# Patient Record
Sex: MALE | Race: BLACK OR AFRICAN AMERICAN | NOT HISPANIC OR LATINO | Employment: FULL TIME | URBAN - METROPOLITAN AREA
[De-identification: names, ages, dates, MRNs, and addresses within clinical notes are randomized per-mention and may not be internally consistent; named-entity substitution may affect disease eponyms.]

---
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
## 2022-04-07 ENCOUNTER — HISTORICAL (OUTPATIENT)
Dept: ADMINISTRATIVE | Facility: HOSPITAL | Age: 22
End: 2022-04-07

## 2022-04-23 VITALS
HEIGHT: 69 IN | DIASTOLIC BLOOD PRESSURE: 77 MMHG | WEIGHT: 140.44 LBS | OXYGEN SATURATION: 100 % | BODY MASS INDEX: 20.8 KG/M2 | SYSTOLIC BLOOD PRESSURE: 121 MMHG

## 2022-04-29 NOTE — ED PROVIDER NOTES
Patient:   Prabhakar Crowe Jr            MRN: [id number]            FIN: [id number]               Age:   20 years     Sex:  Male     :  2000   Associated Diagnoses:   Low back pain, non-specific; UTI (urinary tract infection)   Author:   Shashi Worrell PA-C      Basic Information   Time seen: Date & time 2021 09:43:00.   History source: Patient.   Arrival mode: Private vehicle.      History of Present Illness   The patient presents with   20 year old male presents to ED for low back pain that began this AM. Patient also reports one episode of vomiting this AM. Patient denies injury/trauma, numbness, tingling, bowel/bladder incontinence, dysuria, hematuria, abdominal pain, hx of kidney stones. Denies radiation of low back pain to lower extremities.  The onset was This AM.  The course/duration of symptoms is fluctuating in intensity.  Type of injury: none.  Radiating pain: none. Associated symptoms: denies bowel dysfunction, denies bladder dysfunction, denies altered sensation, denies focal weakness, denies saddle numbness, denies abdominal pain, denies fever, denies chills, denies nausea, denies vomiting, denies dysuria and denies hematuria.        Review of Systems   Constitutional symptoms:  No fever, no chills.    Skin symptoms:  Negative except as documented in HPI.   Eye symptoms:  Negative except as documented in HPI.   ENMT symptoms:  Negative except as documented in HPI.   Respiratory symptoms:  Negative except as documented in HPI.   Cardiovascular symptoms:  Negative except as documented in HPI.   Gastrointestinal symptoms:  Vomiting, No nausea,    Genitourinary symptoms:  No dysuria, no hematuria.    Musculoskeletal symptoms:  Back pain, Muscle pain.    Neurologic symptoms:  No headache, no dizziness, no altered level of consciousness.    Psychiatric symptoms:  Negative except as documented in HPI.   Endocrine symptoms:  Negative except as documented in HPI.   Hematologic/Lymphatic  symptoms:  Negative except as documented in HPI.   Allergy/immunologic symptoms:  Negative except as documented in HPI.             Additional review of systems information: All other systems reviewed and otherwise negative.      Health Status   Allergies:    Allergic Reactions (Selected)  No Known Allergies,    Allergies (1) Active Reaction  No Known Allergies None Documented  .      Past Medical/ Family/ Social History   Medical history:    Active  No Chronic Problems (NKP).   Surgical history:    none..   Family history:    Heart disease  Mother  Hypertension.  Father  .   Social history:    Social & Psychosocial Habits    Alcohol  02/06/2019  Use: Never    Substance Use  02/06/2019  Use: Never    Tobacco  02/06/2019  Use: Never (less than 100 in l    Patient Wants Consult For Cessation Counseling N/A    10/22/2020  Use: Never (less than 100 in l    Patient Wants Consult For Cessation Counseling No    Abuse/Neglect  10/22/2020  SHX Any signs of abuse or neglect No  , Reviewed as documented in chart.   Problem list:    Active Problems (1)  No Chronic Problems   .      Physical Examination               Vital Signs      No qualifying data available.   General:  Alert, no acute distress.    Skin:  Warm, dry.    Ears, nose, mouth and throat:  Oral mucosa moist.   Neck:  Supple, trachea midline.    Cardiovascular:  Regular rate and rhythm, Normal peripheral perfusion.    Respiratory:  Lungs are clear to auscultation, respirations are non-labored, breath sounds are equal, Symmetrical chest wall expansion.    Gastrointestinal:  Soft, Nontender, Non distended, Normal bowel sounds, No bilateral CVA tenderness noted .    Back:  Lumbar: Lateral, mild, tenderness, no swelling, no ecchymosis, no laceration, no abrasion, no step-off, no vertebral point tenderness.   Musculoskeletal:  Normal ROM.   Neurological:  Alert and oriented to person, place, time, and situation, No focal neurological deficit observed.    Psychiatric:   Cooperative.      Medical Decision Making   Differential Diagnosis:  Back pain.   Documents reviewed:  Emergency department nurses' notes.   Orders  Launch Orders   Radiology:  XR Spine Lumbar 2 or 3 Views (Order): Stat, 2/6/2021 9:43 CST, Back Pain, None, Stretcher, Rad Type, Not Scheduled, Launch Orders   Pharmacy:  Toradol for IM (Order): 60 mg, form: Injection, IM, Once, first dose 2/6/2021 9:44 CST, stop date 2/6/2021 9:44 CST, STAT, Launch Orders   Laboratory:  Urinalysis with Reflex (Order): Stat collect, Urine, 2/6/2021 10:25 CST, Nurse collect, Print Label By Order Location  , Launch Orders   Pharmacy:  Zofran ODT 4 mg oral tablet, disintegrating (Order): 4 mg, form: Tab-Dis, Oral, Once, first dose 2/6/2021 11:03 CST, stop date 2/6/2021 11:03 CST, STAT  .    Results review:  Lab results : Lab View   2/6/2021 10:25 CST       UA Appear                 CLOUDY                             UA Color                  DK YELLOW                             UA Spec Grav              1.038  HI                             UA Bili                   Negative                             UA pH                     5.5                             UA Urobilinogen           1.0                             UA Blood                  Negative                             UA Glucose                Negative                             UA Ketones                1+                             UA Protein                1+                             UA Nitrite                Negative                             UA Leuk Est               1+                             UA WBC                    28 /HPF  HI                             UA RBC                    NONE SEEN                             UA Bacteria               NONE SEEN /HPF                             UA Squam Epithelial       NONE SEEN    ,    No qualifying data available.    Radiology results:  Rad Results (ST)  < 12 hrs   Accession: [id number]  Order: XR Spine Lumbar 2  or 3 Views  Report Dt/Tm: 02/06/2021 10:07  Report:   EXAM: XR Spine Lumbar 2 or 3 Views  DATE: 2/6/2021 10:05 AM CST  INDICATION: Back pain     COMPARISON: None available.     TECHNIQUE: AP, lateral, and L5-S1 spot views of the lumbar spine.        FINDINGS:   There are 5 nonrib-bearing lumbar-type vertebral bodies. Mild  straightening of the normal lumbar lordosis, which may be positional.  Alignment is maintained. Vertebral body heights are preserved, with no  radiographic evidence of acute fracture or compression deformity. The  intervertebral disc space heights are maintained. The sacroiliac  joints are congruent.        IMPRESSION:  No acute fracture or subluxation identified on screening radiographs  of the lumbar spine.      .      Reexamination/ Reevaluation   Course: progressing as expected.   Assessment:   Patient in no acute distress. Non-toxic in appearance, afebrile. Patient resting comfortably in room. Reports back pain improved. No vomiting in ED after PO challenge. Discussed with patient lab and imaging results. Discussed wiith patient use of abx for UTI. Discussed zofran as needed. Discussed with patient use of toradol as needed for pain. Discussed importance of not taking toradol in addition to other NSAIDs, such as ibuprofen/tylenol/diclofenac. Discussed folllow up with PCP and ED precautions. Patient verbalized understanding .      Impression and Plan   Diagnosis   Low back pain, non-specific (JTV86-YG M54.5)   Low back pain, non-specific (WNI63-LC M54.5)   UTI (urinary tract infection) (JNW91-UI N39.0)   Plan   Condition: Stable.    Disposition: Discharged: Time  2/6/2021 12:58:00, to home.    Prescriptions: Launch prescriptions   Pharmacy:  cefuroxime 500 mg oral tablet (Prescribe): 500 mg = 1 tab(s), Oral, BID, Take as directed for urinary tract infection, X 7 day(s), # 14 tab(s), 0 Refill(s)  Toradol 10 mg oral tablet (Prescribe): 10 mg = 1 tab(s), Oral, QID, PRN PRN for pain, X 5 day(s), #  20 tab(s), 0 Refill(s)  Zofran 4 mg oral tablet (Prescribe): 4 mg = 1 tab(s), Oral, q8hr, X 3 day(s), # 9 tab(s), 0 Refill(s)  .    Patient was given the following educational materials: Back Exercises, Easy-to-Read, Acute Back Pain, Adult, Urinary Tract Infection, Adult, Urinary Tract Infection, Adult, Acute Back Pain, Adult, Back Exercises, Easy-to-Read.    Follow up with: Follow-up with PCP in 3 to 5 days; Report to Emergency Department if symptoms return or worsen.    Counseled: Patient, Family, Regarding diagnostic results, Regarding treatment plan, Regarding prescription, Patient indicated understanding of instructions.

## 2023-01-17 ENCOUNTER — OFFICE VISIT (OUTPATIENT)
Dept: URGENT CARE | Facility: CLINIC | Age: 23
End: 2023-01-17
Payer: MEDICAID

## 2023-01-17 VITALS
BODY MASS INDEX: 19.06 KG/M2 | HEIGHT: 70 IN | TEMPERATURE: 99 F | WEIGHT: 133.13 LBS | OXYGEN SATURATION: 100 % | SYSTOLIC BLOOD PRESSURE: 126 MMHG | RESPIRATION RATE: 18 BRPM | DIASTOLIC BLOOD PRESSURE: 78 MMHG | HEART RATE: 79 BPM

## 2023-01-17 DIAGNOSIS — J06.9 ACUTE URI: ICD-10-CM

## 2023-01-17 DIAGNOSIS — R22.0 SWELLING OF NOSE: Primary | ICD-10-CM

## 2023-01-17 DIAGNOSIS — Z11.52 ENCOUNTER FOR SCREENING FOR COVID-19: ICD-10-CM

## 2023-01-17 LAB
CTP QC/QA: YES
SARS-COV-2 RDRP RESP QL NAA+PROBE: NEGATIVE

## 2023-01-17 PROCEDURE — 87635 SARS-COV-2 COVID-19 AMP PRB: CPT | Mod: PBBFAC | Performed by: NURSE PRACTITIONER

## 2023-01-17 PROCEDURE — 99213 PR OFFICE/OUTPT VISIT, EST, LEVL III, 20-29 MIN: ICD-10-PCS | Mod: S$PBB,,, | Performed by: NURSE PRACTITIONER

## 2023-01-17 PROCEDURE — 99213 OFFICE O/P EST LOW 20 MIN: CPT | Mod: S$PBB,,, | Performed by: NURSE PRACTITIONER

## 2023-01-17 PROCEDURE — 99214 OFFICE O/P EST MOD 30 MIN: CPT | Mod: PBBFAC | Performed by: NURSE PRACTITIONER

## 2023-01-17 RX ORDER — SULFAMETHOXAZOLE AND TRIMETHOPRIM 800; 160 MG/1; MG/1
1 TABLET ORAL 2 TIMES DAILY
Qty: 20 TABLET | Refills: 0 | Status: SHIPPED | OUTPATIENT
Start: 2023-01-17 | End: 2023-01-27

## 2023-01-17 RX ORDER — LEVOCETIRIZINE DIHYDROCHLORIDE 5 MG/1
5 TABLET, FILM COATED ORAL NIGHTLY
Qty: 14 TABLET | Refills: 0 | Status: SHIPPED | OUTPATIENT
Start: 2023-01-17 | End: 2023-01-31

## 2023-01-17 NOTE — PROGRESS NOTES
"Subjective:       Patient ID: Prabhakar Crowe Jr. is a 22 y.o. male.    Vitals:  height is 5' 9.69" (1.77 m) and weight is 60.4 kg (133 lb 1.6 oz). His oral temperature is 99 °F (37.2 °C). His blood pressure is 126/78 and his pulse is 79. His respiration is 18 and oxygen saturation is 100%.     Chief Complaint: Headache, Sinus Problem (Since last wednesday), and Cough    CC as above.       Constitution: Negative.   HENT:  Positive for congestion.    Neck: neck negative.   Cardiovascular: Negative.    Respiratory:  Positive for cough.    Neurological:  Positive for headaches.     Objective:      Physical Exam   Constitutional: He is oriented to person, place, and time. He appears well-developed.   HENT:   Head: Normocephalic.   Ears:   Right Ear: Tympanic membrane normal.   Left Ear: Tympanic membrane normal.   Nose: Rhinorrhea present.      Comments: Slight swelling to L nostril, tenderness  Mouth/Throat: Oropharynx is clear.   Eyes: Conjunctivae and EOM are normal. Pupils are equal, round, and reactive to light.   Neck: Neck supple.   Cardiovascular: Normal rate, regular rhythm and normal heart sounds.   Pulmonary/Chest: Effort normal and breath sounds normal.   Musculoskeletal: Normal range of motion.         General: Normal range of motion.   Neurological: He is alert and oriented to person, place, and time.   Skin: Skin is warm and dry.   Psychiatric: His behavior is normal.   Vitals reviewed.      Assessment:       1. Swelling of nose    2. Encounter for screening for COVID-19    3. Acute URI              Office Visit on 01/17/2023   Component Date Value Ref Range Status    POC Rapid COVID 01/17/2023 Negative  Negative Final     Acceptable 01/17/2023 Yes   Final        No results found.   Plan:         Medication as ordered.If any shortness of breath, wheezing, continued fevers or any new symptoms then immediately go to ER.      Swelling of nose  -     sulfamethoxazole-trimethoprim 800-160mg " (BACTRIM DS) 800-160 mg Tab; Take 1 tablet by mouth 2 (two) times daily. for 10 days  Dispense: 20 tablet; Refill: 0  -     levocetirizine (XYZAL) 5 MG tablet; Take 1 tablet (5 mg total) by mouth every evening. for 14 days  Dispense: 14 tablet; Refill: 0    Encounter for screening for COVID-19  -     POCT COVID-19 Rapid Screening    Acute URI  -     levocetirizine (XYZAL) 5 MG tablet; Take 1 tablet (5 mg total) by mouth every evening. for 14 days  Dispense: 14 tablet; Refill: 0

## 2024-04-06 ENCOUNTER — HOSPITAL ENCOUNTER (EMERGENCY)
Facility: HOSPITAL | Age: 24
Discharge: HOME | End: 2024-04-07
Attending: STUDENT IN AN ORGANIZED HEALTH CARE EDUCATION/TRAINING PROGRAM

## 2024-04-06 VITALS
WEIGHT: 140 LBS | HEART RATE: 63 BPM | BODY MASS INDEX: 20.73 KG/M2 | TEMPERATURE: 98 F | OXYGEN SATURATION: 99 % | RESPIRATION RATE: 16 BRPM | SYSTOLIC BLOOD PRESSURE: 126 MMHG | HEIGHT: 69 IN | DIASTOLIC BLOOD PRESSURE: 67 MMHG

## 2024-04-06 DIAGNOSIS — K04.7 DENTAL ABSCESS: Primary | ICD-10-CM

## 2024-04-06 LAB
ALBUMIN SERPL-MCNC: 4 G/DL (ref 3.5–5)
ALBUMIN/GLOB SERPL: 0.9 RATIO (ref 1.1–2)
ALP SERPL-CCNC: 50 UNIT/L (ref 40–150)
ALT SERPL-CCNC: 8 UNIT/L (ref 0–55)
AST SERPL-CCNC: 30 UNIT/L (ref 5–34)
BASOPHILS # BLD AUTO: 0.02 X10(3)/MCL
BASOPHILS NFR BLD AUTO: 0.2 %
BILIRUB SERPL-MCNC: 0.6 MG/DL
BUN SERPL-MCNC: 16.7 MG/DL (ref 8.9–20.6)
CALCIUM SERPL-MCNC: 9.3 MG/DL (ref 8.4–10.2)
CHLORIDE SERPL-SCNC: 105 MMOL/L (ref 98–107)
CO2 SERPL-SCNC: 22 MMOL/L (ref 22–29)
CREAT SERPL-MCNC: 0.95 MG/DL (ref 0.73–1.18)
EOSINOPHIL # BLD AUTO: 0.03 X10(3)/MCL (ref 0–0.9)
EOSINOPHIL NFR BLD AUTO: 0.3 %
ERYTHROCYTE [DISTWIDTH] IN BLOOD BY AUTOMATED COUNT: 13.2 % (ref 11.5–17)
GFR SERPLBLD CREATININE-BSD FMLA CKD-EPI: >60 MLS/MIN/1.73/M2
GLOBULIN SER-MCNC: 4.3 GM/DL (ref 2.4–3.5)
GLUCOSE SERPL-MCNC: 103 MG/DL (ref 74–100)
HCT VFR BLD AUTO: 39.4 % (ref 42–52)
HGB BLD-MCNC: 13 G/DL (ref 14–18)
IMM GRANULOCYTES # BLD AUTO: 0.02 X10(3)/MCL (ref 0–0.04)
IMM GRANULOCYTES NFR BLD AUTO: 0.2 %
LYMPHOCYTES # BLD AUTO: 1.85 X10(3)/MCL (ref 0.6–4.6)
LYMPHOCYTES NFR BLD AUTO: 18.4 %
MCH RBC QN AUTO: 29.8 PG (ref 27–31)
MCHC RBC AUTO-ENTMCNC: 33 G/DL (ref 33–36)
MCV RBC AUTO: 90.4 FL (ref 80–94)
MONOCYTES # BLD AUTO: 1.29 X10(3)/MCL (ref 0.1–1.3)
MONOCYTES NFR BLD AUTO: 12.9 %
NEUTROPHILS # BLD AUTO: 6.82 X10(3)/MCL (ref 2.1–9.2)
NEUTROPHILS NFR BLD AUTO: 68 %
NRBC BLD AUTO-RTO: 0 %
PLATELET # BLD AUTO: 195 X10(3)/MCL (ref 130–400)
PMV BLD AUTO: 12.5 FL (ref 7.4–10.4)
POTASSIUM SERPL-SCNC: 4.6 MMOL/L (ref 3.5–5.1)
PROT SERPL-MCNC: 8.3 GM/DL (ref 6.4–8.3)
RBC # BLD AUTO: 4.36 X10(6)/MCL (ref 4.7–6.1)
SODIUM SERPL-SCNC: 137 MMOL/L (ref 136–145)
WBC # SPEC AUTO: 10.03 X10(3)/MCL (ref 4.5–11.5)

## 2024-04-06 PROCEDURE — 99285 EMERGENCY DEPT VISIT HI MDM: CPT | Mod: 25

## 2024-04-06 PROCEDURE — 85025 COMPLETE CBC W/AUTO DIFF WBC: CPT | Performed by: STUDENT IN AN ORGANIZED HEALTH CARE EDUCATION/TRAINING PROGRAM

## 2024-04-06 PROCEDURE — 80053 COMPREHEN METABOLIC PANEL: CPT | Performed by: STUDENT IN AN ORGANIZED HEALTH CARE EDUCATION/TRAINING PROGRAM

## 2024-04-07 PROCEDURE — 87070 CULTURE OTHR SPECIMN AEROBIC: CPT | Performed by: STUDENT IN AN ORGANIZED HEALTH CARE EDUCATION/TRAINING PROGRAM

## 2024-04-07 PROCEDURE — 63600175 PHARM REV CODE 636 W HCPCS: Mod: JZ,JG | Performed by: STUDENT IN AN ORGANIZED HEALTH CARE EDUCATION/TRAINING PROGRAM

## 2024-04-07 PROCEDURE — 25500020 PHARM REV CODE 255: Performed by: STUDENT IN AN ORGANIZED HEALTH CARE EDUCATION/TRAINING PROGRAM

## 2024-04-07 PROCEDURE — 10160 PNXR ASPIR ABSC HMTMA BULLA: CPT

## 2024-04-07 RX ORDER — BUPIVACAINE HYDROCHLORIDE 2.5 MG/ML
5 INJECTION, SOLUTION EPIDURAL; INFILTRATION; INTRACAUDAL
Status: COMPLETED | OUTPATIENT
Start: 2024-04-07 | End: 2024-04-07

## 2024-04-07 RX ORDER — HYDROCODONE BITARTRATE AND ACETAMINOPHEN 5; 325 MG/1; MG/1
1 TABLET ORAL EVERY 6 HOURS PRN
Qty: 12 TABLET | Refills: 0 | Status: SHIPPED | OUTPATIENT
Start: 2024-04-07

## 2024-04-07 RX ORDER — CHLORHEXIDINE GLUCONATE ORAL RINSE 1.2 MG/ML
15 SOLUTION DENTAL 2 TIMES DAILY
Qty: 420 ML | Refills: 0 | Status: SHIPPED | OUTPATIENT
Start: 2024-04-07 | End: 2024-04-21

## 2024-04-07 RX ORDER — CLINDAMYCIN HYDROCHLORIDE 150 MG/1
450 CAPSULE ORAL EVERY 8 HOURS
Qty: 90 CAPSULE | Refills: 0 | Status: SHIPPED | OUTPATIENT
Start: 2024-04-07 | End: 2024-04-17

## 2024-04-07 RX ADMIN — IOHEXOL 100 ML: 350 INJECTION, SOLUTION INTRAVENOUS at 12:04

## 2024-04-07 RX ADMIN — BUPIVACAINE HYDROCHLORIDE 12.5 MG: 2.5 INJECTION, SOLUTION EPIDURAL; INFILTRATION; INTRACAUDAL; PERINEURAL at 01:04

## 2024-04-07 NOTE — ED PROVIDER NOTES
Encounter Date: 4/6/2024       History     Chief Complaint   Patient presents with    Abscess     Abscess noted to R lower side of jaw. States he started to notice it a week ago. No drainage noted. Tylenol taken around 6pm tonight.     HPI    23-year-old male with no stated past medical history presents emergency department for an abscess to the right side of his jaw.  States it started a week ago.  Denies any dental pain.  No poor dentition for patient, no fever.  States he thought it would get better but just got bigger.    Review of patient's allergies indicates:  No Known Allergies  History reviewed. No pertinent past medical history.  History reviewed. No pertinent surgical history.  No family history on file.  Social History     Tobacco Use    Smoking status: Some Days     Types: Cigarettes, Cigars    Smokeless tobacco: Never   Substance Use Topics    Alcohol use: Not Currently    Drug use: Never     Review of Systems   Constitutional:  Negative for fever.   HENT:  Negative for dental problem and sore throat.    Respiratory:  Negative for cough and shortness of breath.    Cardiovascular:  Negative for chest pain.   Gastrointestinal:  Negative for abdominal pain, constipation, diarrhea, nausea and vomiting.   Genitourinary:  Negative for dysuria.   Musculoskeletal:  Negative for back pain.   Skin:  Negative for rash and wound.   Neurological:  Negative for weakness and headaches.   Hematological:  Does not bruise/bleed easily.   All other systems reviewed and are negative.      Physical Exam     Initial Vitals [04/06/24 2238]   BP Pulse Resp Temp SpO2   126/67 63 16 98.3 °F (36.8 °C) 99 %      MAP       --         Physical Exam    Nursing note and vitals reviewed.  Constitutional: He appears well-developed and well-nourished. No distress.   Cardiovascular:  Normal rate and regular rhythm.           Pulmonary/Chest: Breath sounds normal. No respiratory distress.   Abdominal: Abdomen is soft. There is no  abdominal tenderness.   Musculoskeletal:         General: No tenderness. Normal range of motion.     Neurological: He is alert and oriented to person, place, and time.   Skin: Skin is warm. Capillary refill takes less than 2 seconds. Abscess (large soft fluctuant area to the right anterior jaw that is approximately 2 x 2 cm) noted.         ED Course   Abscess Aspiration    Date/Time: 4/7/2024 1:25 AM    Performed by: Jose Bertrand MD  Authorized by: Jose Bertrand MD    Consent Done?:  Yes  Universal Protocol:     Verbal consent obtained?: Yes      Risks and benefits: Risks, benefits and alternatives were discussed      Consent given by:  Patient  A time out verifies correct patient, procedure, equipment, support staff and site/side marked as required:   Anesthesia:     Local anesthetic:  Bupivacaine 0.25% without epinephrine  Procedure Details:     Site prepped with:  Chlorhexadine    Location of Abscess #1:  Right face    Size of needle #1:  18  Material send for:  Anaerobic Culture  Post-procedure:     Patient tolerance:  Patient tolerated the procedure well with no immediate complications    Labs Reviewed   COMPREHENSIVE METABOLIC PANEL - Abnormal; Notable for the following components:       Result Value    Glucose Level 103 (*)     Globulin 4.3 (*)     Albumin/Globulin Ratio 0.9 (*)     All other components within normal limits   CBC WITH DIFFERENTIAL - Abnormal; Notable for the following components:    RBC 4.36 (*)     Hgb 13.0 (*)     Hct 39.4 (*)     MPV 12.5 (*)     All other components within normal limits   WOUND CULTURE (OLG)   CBC W/ AUTO DIFFERENTIAL    Narrative:     The following orders were created for panel order CBC auto differential.  Procedure                               Abnormality         Status                     ---------                               -----------         ------                     CBC with Differential[[id number]]       Abnormal            Final result                  Please view results for these tests on the individual orders.          Imaging Results              CT Maxillofacial With Contrast (Preliminary result)  Result time 04/07/24 01:04:50      Preliminary result by Kane Sethi MD (04/07/24 01:04:50)                   Narrative:    START OF REPORT:  Technique: Maxillofacial CT including orbits was performed with intravenous contrast with axial as well as sagittal and coronal images.    Comparison: None.    Clinical history: 23-year-old male with no stated past medical history presents emergency department for an abscess to the right side of his jaw. States it started a week ago. Denies any dental pain. No poor dentition for patient, no fever. States he thought it would get better but just got bigger.    Findings:  Orbital soft tissues: The orbital soft tissues appear unremarkable.  Bones:  Orbital bony structures: The bilateral orbital bony structures are intact with no orbital fracture identified.  Mandible: There are air lucencies and osteolytic changes within the socket and involving the root of the 1st and 2nd lower right pre-molar teeth extending to the outer cortex of the mandibular body (series 4, image 29). There is an associated large fluid attenuating focus with some rim enhancement in the right buccal and malar regions superficial to the aforementioned finding. It measures 2.9 x 3.9 cm and is seen in Series no 2, Images 13 through 33. Findings represent periodontitis with abscess formation.  Maxilla: The maxilla appears unremarkable.  Pterygoid plates: No fracture identified of the right or left pterygoid plates.  Zygoma: The zygomatic arches are intact.  TMJ: The mandibular condyles appear normally placed with respect to the mandibular fossa.  Nasal Bones: The nasal septum is midline.  Skull: No acute linear or depressed fracture is identified in the visualized skull.  Paranasal sinuses: The visualized paranasal sinuses appear clear with no  mucoperiosteal thickening or air fluid levels identified.  Mastoid air cells: The visualized mastoid air cells appear clear.  Brain: The visualized intracranial structures appear unremarkable.      Impression:  1. There are air lucencies and osteolytic changes within the socket and involving the root of the 1st and 2nd lower right pre-molar teeth extending to the outer cortex of the mandibular body (series 4, image 29). There is an associated large fluid attenuating focus with some rim enhancement in the right buccal and malar regions superficial to the aforementioned finding. It measures 2.9 x 3.9 cm and is seen in Series no 2, Images 13 through 33. Findings represent periodontitis with abscess formation.  2. Details and findings as noted above.                                         Medications   iohexoL (OMNIPAQUE 350) injection 100 mL (100 mLs Intravenous Given 4/7/24 0031)   BUPivacaine (PF) 0.25% (2.5 mg/ml) injection 12.5 mg (12.5 mg Dental Given 4/7/24 0115)     Medical Decision Making  differential diagnosis  Abscess, dental infection, parotid gland infection,  as well as multiple other possible etiologies      Amount and/or Complexity of Data Reviewed  Labs: ordered. Decision-making details documented in ED Course.  Radiology: ordered.    Risk  Prescription drug management.               ED Course as of 04/07/24 0126   Sat Apr 06, 2024   2324 WBC: 10.03 [BS]   2324 Hemoglobin(!): 13.0 [BS]   2324 Hematocrit(!): 39.4 [BS]   2324 Platelet Count: 195 [BS]   Sun Apr 07, 2024   0002 Sodium: 137 [BS]   0002 Potassium: 4.6 [BS]   0002 Chloride: 105 [BS]   0002 CO2: 22 [BS]   0002 Glucose(!): 103 [BS]   0002 BUN: 16.7 [BS]   0002 Creatinine: 0.95 [BS]      ED Course User Index  [BS] Jose Bertrand MD                           Clinical Impression:  Final diagnoses:  [K04.7] Dental abscess (Primary)          ED Disposition Condition    Discharge Stable          ED Prescriptions       Medication Sig Dispense  Start Date End Date Auth. Provider    HYDROcodone-acetaminophen (NORCO) 5-325 mg per tablet Take 1 tablet by mouth every 6 (six) hours as needed for Pain. 12 tablet 4/7/2024 -- Jose Bertrand MD    clindamycin (CLEOCIN) 150 MG capsule Take 3 capsules (450 mg total) by mouth every 8 (eight) hours. for 10 days 90 capsule 4/7/2024 4/17/2024 Jose Bertrand MD    chlorhexidine (PERIDEX) 0.12 % solution Use as directed 15 mLs in the mouth or throat 2 (two) times daily. for 14 days 420 mL 4/7/2024 4/21/2024 Jose Bertrand MD          Follow-up Information       Follow up With Specialties Details Why Contact Info    [facility] - Emergency Dept Emergency Medicine Go to  If symptoms worsen [id number]  [facility] [phone]    Follow-up with dentist                 Jose Bertrand MD  04/07/24 7780

## 2024-04-10 LAB — BACTERIA WND CULT: NO GROWTH
